# Patient Record
Sex: MALE | URBAN - METROPOLITAN AREA
[De-identification: names, ages, dates, MRNs, and addresses within clinical notes are randomized per-mention and may not be internally consistent; named-entity substitution may affect disease eponyms.]

---

## 2017-05-31 ENCOUNTER — INPATIENT (INPATIENT)
Facility: HOSPITAL | Age: 77
LOS: 0 days | Discharge: HOME | DRG: 728 | End: 2017-06-01
Attending: INTERNAL MEDICINE | Admitting: INTERNAL MEDICINE
Payer: MEDICARE

## 2017-05-31 VITALS
HEART RATE: 91 BPM | WEIGHT: 134.92 LBS | DIASTOLIC BLOOD PRESSURE: 98 MMHG | SYSTOLIC BLOOD PRESSURE: 179 MMHG | RESPIRATION RATE: 18 BRPM | OXYGEN SATURATION: 100 % | TEMPERATURE: 98 F

## 2017-05-31 DIAGNOSIS — Z29.9 ENCOUNTER FOR PROPHYLACTIC MEASURES, UNSPECIFIED: ICD-10-CM

## 2017-05-31 DIAGNOSIS — Z90.49 ACQUIRED ABSENCE OF OTHER SPECIFIED PARTS OF DIGESTIVE TRACT: Chronic | ICD-10-CM

## 2017-05-31 LAB
ALBUMIN SERPL ELPH-MCNC: 4.4 G/DL — SIGNIFICANT CHANGE UP (ref 3.3–5)
ALP SERPL-CCNC: 69 U/L — SIGNIFICANT CHANGE UP (ref 40–120)
ALT FLD-CCNC: 21 U/L — SIGNIFICANT CHANGE UP (ref 10–45)
ANION GAP SERPL CALC-SCNC: 17 MMOL/L — SIGNIFICANT CHANGE UP (ref 5–17)
APPEARANCE UR: CLEAR — SIGNIFICANT CHANGE UP
AST SERPL-CCNC: 33 U/L — SIGNIFICANT CHANGE UP (ref 10–40)
BASOPHILS NFR BLD AUTO: 1 % — SIGNIFICANT CHANGE UP (ref 0–2)
BILIRUB SERPL-MCNC: 0.6 MG/DL — SIGNIFICANT CHANGE UP (ref 0.2–1.2)
BILIRUB UR-MCNC: NEGATIVE — SIGNIFICANT CHANGE UP
BUN SERPL-MCNC: 11 MG/DL — SIGNIFICANT CHANGE UP (ref 7–23)
CALCIUM SERPL-MCNC: 9.4 MG/DL — SIGNIFICANT CHANGE UP (ref 8.4–10.5)
CHLORIDE SERPL-SCNC: 88 MMOL/L — LOW (ref 96–108)
CK MB CFR SERPL CALC: 4.1 NG/ML — SIGNIFICANT CHANGE UP (ref 0–6.7)
CK SERPL-CCNC: 137 U/L — SIGNIFICANT CHANGE UP (ref 30–200)
CO2 SERPL-SCNC: 20 MMOL/L — LOW (ref 22–31)
COLOR SPEC: YELLOW — SIGNIFICANT CHANGE UP
CREAT SERPL-MCNC: 0.9 MG/DL — SIGNIFICANT CHANGE UP (ref 0.5–1.3)
DIFF PNL FLD: NEGATIVE — SIGNIFICANT CHANGE UP
EOSINOPHIL NFR BLD AUTO: 1.8 % — SIGNIFICANT CHANGE UP (ref 0–6)
GLUCOSE SERPL-MCNC: 112 MG/DL — HIGH (ref 70–99)
GLUCOSE UR QL: NEGATIVE — SIGNIFICANT CHANGE UP
HCT VFR BLD CALC: 37.4 % — LOW (ref 39–50)
HGB BLD-MCNC: 13.8 G/DL — SIGNIFICANT CHANGE UP (ref 13–17)
INR BLD: 1.06 — SIGNIFICANT CHANGE UP (ref 0.88–1.16)
KETONES UR-MCNC: (no result) MG/DL
LACTATE SERPL-SCNC: 2.1 MMOL/L — HIGH (ref 0.5–2)
LEUKOCYTE ESTERASE UR-ACNC: NEGATIVE — SIGNIFICANT CHANGE UP
LYMPHOCYTES # BLD AUTO: 11.7 % — LOW (ref 13–44)
MCHC RBC-ENTMCNC: 30.9 PG — SIGNIFICANT CHANGE UP (ref 27–34)
MCHC RBC-ENTMCNC: 36.9 G/DL — HIGH (ref 32–36)
MCV RBC AUTO: 83.9 FL — SIGNIFICANT CHANGE UP (ref 80–100)
MONOCYTES NFR BLD AUTO: 9.4 % — SIGNIFICANT CHANGE UP (ref 2–14)
NEUTROPHILS NFR BLD AUTO: 76.1 % — SIGNIFICANT CHANGE UP (ref 43–77)
NITRITE UR-MCNC: NEGATIVE — SIGNIFICANT CHANGE UP
PH UR: 8 — SIGNIFICANT CHANGE UP (ref 5–8)
PLATELET # BLD AUTO: 275 K/UL — SIGNIFICANT CHANGE UP (ref 150–400)
POTASSIUM SERPL-MCNC: 3.5 MMOL/L — SIGNIFICANT CHANGE UP (ref 3.5–5.3)
POTASSIUM SERPL-SCNC: 3.5 MMOL/L — SIGNIFICANT CHANGE UP (ref 3.5–5.3)
PROT SERPL-MCNC: 7 G/DL — SIGNIFICANT CHANGE UP (ref 6–8.3)
PROT UR-MCNC: NEGATIVE MG/DL — SIGNIFICANT CHANGE UP
PROTHROM AB SERPL-ACNC: 11.8 SEC — SIGNIFICANT CHANGE UP (ref 9.8–12.7)
RBC # BLD: 4.46 M/UL — SIGNIFICANT CHANGE UP (ref 4.2–5.8)
RBC # FLD: 11.8 % — SIGNIFICANT CHANGE UP (ref 10.3–16.9)
SODIUM SERPL-SCNC: 125 MMOL/L — LOW (ref 135–145)
SP GR SPEC: 1.01 — SIGNIFICANT CHANGE UP (ref 1–1.03)
TROPONIN T SERPL-MCNC: <0.01 NG/ML — SIGNIFICANT CHANGE UP (ref 0–0.01)
TSH SERPL-MCNC: 2.02 UIU/ML — SIGNIFICANT CHANGE UP (ref 0.35–4.94)
UROBILINOGEN FLD QL: 0.2 E.U./DL — SIGNIFICANT CHANGE UP
WBC # BLD: 6.7 K/UL — SIGNIFICANT CHANGE UP (ref 3.8–10.5)
WBC # FLD AUTO: 6.7 K/UL — SIGNIFICANT CHANGE UP (ref 3.8–10.5)

## 2017-05-31 PROCEDURE — 74174 CTA ABD&PLVS W/CONTRAST: CPT | Mod: 26

## 2017-05-31 PROCEDURE — 93010 ELECTROCARDIOGRAM REPORT: CPT | Mod: NC

## 2017-05-31 PROCEDURE — 71275 CT ANGIOGRAPHY CHEST: CPT | Mod: 26

## 2017-05-31 PROCEDURE — 99285 EMERGENCY DEPT VISIT HI MDM: CPT | Mod: 25

## 2017-05-31 PROCEDURE — 71010: CPT | Mod: 26

## 2017-05-31 PROCEDURE — 99222 1ST HOSP IP/OBS MODERATE 55: CPT | Mod: GC

## 2017-05-31 RX ORDER — LOSARTAN POTASSIUM 100 MG/1
50 TABLET, FILM COATED ORAL DAILY
Qty: 0 | Refills: 0 | Status: DISCONTINUED | OUTPATIENT
Start: 2017-06-01 | End: 2017-06-01

## 2017-05-31 RX ORDER — SODIUM CHLORIDE 9 MG/ML
1000 INJECTION INTRAMUSCULAR; INTRAVENOUS; SUBCUTANEOUS
Qty: 0 | Refills: 0 | Status: DISCONTINUED | OUTPATIENT
Start: 2017-05-31 | End: 2017-05-31

## 2017-05-31 RX ORDER — CIPROFLOXACIN LACTATE 400MG/40ML
400 VIAL (ML) INTRAVENOUS EVERY 12 HOURS
Qty: 0 | Refills: 0 | Status: DISCONTINUED | OUTPATIENT
Start: 2017-05-31 | End: 2017-06-01

## 2017-05-31 RX ORDER — TELMISARTAN 20 MG/1
1 TABLET ORAL
Qty: 0 | Refills: 0 | COMMUNITY

## 2017-05-31 RX ORDER — LOSARTAN POTASSIUM 100 MG/1
25 TABLET, FILM COATED ORAL ONCE
Qty: 0 | Refills: 0 | Status: COMPLETED | OUTPATIENT
Start: 2017-05-31 | End: 2017-05-31

## 2017-05-31 RX ORDER — AMLODIPINE BESYLATE 2.5 MG/1
1 TABLET ORAL
Qty: 0 | Refills: 0 | COMMUNITY

## 2017-05-31 RX ORDER — SODIUM CHLORIDE 9 MG/ML
1000 INJECTION INTRAMUSCULAR; INTRAVENOUS; SUBCUTANEOUS ONCE
Qty: 0 | Refills: 0 | Status: COMPLETED | OUTPATIENT
Start: 2017-05-31 | End: 2017-05-31

## 2017-05-31 RX ORDER — AMLODIPINE BESYLATE 2.5 MG/1
5 TABLET ORAL ONCE
Qty: 0 | Refills: 0 | Status: COMPLETED | OUTPATIENT
Start: 2017-05-31 | End: 2017-05-31

## 2017-05-31 RX ORDER — HEPARIN SODIUM 5000 [USP'U]/ML
5000 INJECTION INTRAVENOUS; SUBCUTANEOUS EVERY 8 HOURS
Qty: 0 | Refills: 0 | Status: DISCONTINUED | OUTPATIENT
Start: 2017-05-31 | End: 2017-06-01

## 2017-05-31 RX ORDER — AMLODIPINE BESYLATE 2.5 MG/1
5 TABLET ORAL DAILY
Qty: 0 | Refills: 0 | Status: DISCONTINUED | OUTPATIENT
Start: 2017-05-31 | End: 2017-06-01

## 2017-05-31 RX ORDER — ONDANSETRON 8 MG/1
4 TABLET, FILM COATED ORAL EVERY 6 HOURS
Qty: 0 | Refills: 0 | Status: DISCONTINUED | OUTPATIENT
Start: 2017-05-31 | End: 2017-06-01

## 2017-05-31 RX ORDER — TAMSULOSIN HYDROCHLORIDE 0.4 MG/1
0.4 CAPSULE ORAL AT BEDTIME
Qty: 0 | Refills: 0 | Status: DISCONTINUED | OUTPATIENT
Start: 2017-05-31 | End: 2017-06-01

## 2017-05-31 RX ORDER — SODIUM CHLORIDE 9 MG/ML
1000 INJECTION INTRAMUSCULAR; INTRAVENOUS; SUBCUTANEOUS
Qty: 0 | Refills: 0 | Status: DISCONTINUED | OUTPATIENT
Start: 2017-05-31 | End: 2017-06-01

## 2017-05-31 RX ORDER — LOSARTAN POTASSIUM 100 MG/1
6.25 TABLET, FILM COATED ORAL DAILY
Qty: 0 | Refills: 0 | Status: DISCONTINUED | OUTPATIENT
Start: 2017-06-01 | End: 2017-06-01

## 2017-05-31 RX ADMIN — LOSARTAN POTASSIUM 25 MILLIGRAM(S): 100 TABLET, FILM COATED ORAL at 20:41

## 2017-05-31 RX ADMIN — SODIUM CHLORIDE 1000 MILLILITER(S): 9 INJECTION INTRAMUSCULAR; INTRAVENOUS; SUBCUTANEOUS at 18:30

## 2017-05-31 RX ADMIN — TAMSULOSIN HYDROCHLORIDE 0.4 MILLIGRAM(S): 0.4 CAPSULE ORAL at 22:01

## 2017-05-31 RX ADMIN — SODIUM CHLORIDE 120 MILLILITER(S): 9 INJECTION INTRAMUSCULAR; INTRAVENOUS; SUBCUTANEOUS at 23:36

## 2017-05-31 RX ADMIN — SODIUM CHLORIDE 100 MILLILITER(S): 9 INJECTION INTRAMUSCULAR; INTRAVENOUS; SUBCUTANEOUS at 19:34

## 2017-05-31 RX ADMIN — AMLODIPINE BESYLATE 5 MILLIGRAM(S): 2.5 TABLET ORAL at 20:41

## 2017-05-31 RX ADMIN — Medication 200 MILLIGRAM(S): at 22:01

## 2017-05-31 NOTE — H&P ADULT - ASSESSMENT
78 yo M with pmhx of HTN, bradycardia and recent prostatitis who presented w/SOB and weakness found to have HTN urgency and urinary retention likely secondary to prostatitis.

## 2017-05-31 NOTE — H&P ADULT - HISTORY OF PRESENT ILLNESS
Pt is a 78 yo M with pmhx of labile HTN, bradycardia w/syncopal episodes, and recent prostatitis who presents tonight with complaints of progressily feeling weak and short of breath. He reports going to the ED a few days ago for shortness of breath, jaw pain and nausea. A work up was done at a hospital in [facility] which was negative (including troponins and EKG) and he was sent home. Of note, he also had a recent echocardiogram done by his cardiologist Valerie Taylor, which was unchanged and WNL. He also reports recently seeing his urologist who prescribed a 10 day course of Ciprofloxacin which he finished on three days ago and had been symptom free until tonight; he also reports an increased PSA of 8. Since in the ED the pt reports having increased hesitancy with nausea, w/o vomiting or diarrhea or fever but does endorse chills.     Of note in the ED the pt was found to be retaining urine on a bladder scan. He reports feeling relief after the Pt is a 78 yo M with pmhx of labile HTN, bradycardia w/syncopal episodes, and recent prostatitis who presents tonight with complaints of progressily feeling weak and short of breath. He reports going to the ED a few days ago for shortness of breath, jaw pain and nausea. A work up was done at a hospital in [facility] which was negative (including troponins and EKG) and he was sent home. Of note, he also had a recent echocardiogram done by his cardiologist Dr. Taylor, which was unchanged and WNL. He also reports recently seeing his urologist who prescribed a 10 day course of Ciprofloxacin which he finished on three days ago and had been symptom free until tonight; he also reports an increased PSA of 8. Since in the ED the pt reports having increased hesitancy with nausea, w/o vomiting or diarrhea or fever but does endorse chills.     Of note in the ED the pt was found to be retaining 380cc of urine on a bladder scan. He reports feeling relief after the catheterization. 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 He reports going to the ED a few days ago for shortness of breath, jaw pain and nausea. A work up was done at a [facility] in [facility] which was negative (including troponins and EKG) and he was sent home. Of note, he also had a recent echocardiogram done by his cardiologist Dr. Taylor, which was unchanged and WNL. He also reports recently seeing his urologist who prescribed a 10 day course of Ciprofloxacin which he finished on three days ago and had been symptom free until tonight; he also reports an increased PSA of 8. Since in the ED the pt reports having increased hesitancy with nausea, w/o vomiting or diarrhea or fever but does endorse chills.     Of note in the ED the pt was found to be retaining 380cc of urine on a bladder scan. He reports feeling relief after the catheterization.

## 2017-05-31 NOTE — H&P ADULT - NSHPPHYSICALEXAM_GEN_ALL_CORE
.  VITAL SIGNS:  T(C): 36.6, Max: 36.8 (05-31 @ 16:44)  T(F): 97.9, Max: 98.3 (05-31 @ 16:44)  HR: 71 (68 - 91)  BP: 156/79 (156/79 - 189/83)  BP(mean): --  RR: 18 (18 - 18)  SpO2: 97% (97% - 100%)  Wt(kg): --    PHYSICAL EXAM:    Constitutional: WDWN resting comfortably in bed; NAD  Head: NC/AT  Eyes: PERRL, EOMI, clear conjunctiva  ENT: no nasal discharge; uvula midline, no oropharyngeal erythema or exudates; MMM  Neck: supple; no JVD or thyromegaly  Respiratory: CTA B/L; no W/R/R, no retractions  Cardiac: +S1/S2; RRR; no M/R/G; PMI non-displaced  Gastrointestinal: soft, NT/ND; no rebound or guarding; +BSx4 -  Genitourinary: normal external genitalia, sandoval in place, blood at the meatus   Extremities: WWP, no clubbing or cyanosis; no peripheral edema  Musculoskeletal: NROM x4; no joint swelling, tenderness or erythema  Vascular: 2+ PT pulses B/L  Dermatologic: skin warm, dry and intact; no rashes, wounds, or scars  Lymphatic: no submandibular or cervical LAD  Neurologic: AAOx3; CNII-XII grossly intact; no focal deficits  Psychiatric: affect and characteristics of appearance, verbalizations, behaviors are appropriate .  VITAL SIGNS:  T(C): 36.6, Max: 36.8 (05-31 @ 16:44)  T(F): 97.9, Max: 98.3 (05-31 @ 16:44)  HR: 71 (68 - 91)  BP: 156/79 (156/79 - 189/83)  BP(mean): --  RR: 18 (18 - 18)  SpO2: 97% (97% - 100%)  Wt(kg): --    PHYSICAL EXAM:    Constitutional: WDWN resting comfortably in bed; NAD  Head: NC/AT  Eyes: PERRL, EOMI, clear conjunctiva  ENT: no nasal discharge; uvula midline, no oropharyngeal erythema or exudates; dry MM  Neck: supple; no JVD or thyromegaly  Respiratory: CTA B/L; no W/R/R, no retractions  Cardiac: +S1/S2; RRR; no M/R/G; PMI non-displaced  Gastrointestinal: soft, NT/ND; no rebound or guarding; +BSx4 -  Genitourinary: normal external genitalia, sandoval in place, blood at the meatus   Extremities: WWP, no clubbing or cyanosis; no peripheral edema  Musculoskeletal: NROM x4; no joint swelling, tenderness or erythema  Vascular: 2+ PT pulses B/L  Dermatologic: skin warm, dry and intact; no rashes, wounds, or scars  Lymphatic: no submandibular or cervical LAD  Neurologic: AAOx3; CNII-XII grossly intact; no focal deficits  Psychiatric: affect and characteristics of appearance, verbalizations, behaviors are appropriate .  VITAL SIGNS:  T(C): 36.6, Max: 36.8 (05-31 @ 16:44)  T(F): 97.9, Max: 98.3 (05-31 @ 16:44)  HR: 71 (68 - 91)  BP: 156/79 (156/79 - 189/83)  BP(mean): --  RR: 18 (18 - 18)  SpO2: 97% (97% - 100%)  Wt(kg): --    PHYSICAL EXAM:    Constitutional: WDWN resting comfortably in bed; NAD  Head: NC/AT  Eyes: PERRL, EOMI, clear conjunctiva  ENT: no nasal discharge; uvula midline, no oropharyngeal erythema or exudates; dry MM  Neck: supple; no JVD or thyromegaly  Respiratory: CTA B/L; no W/R/R, no retractions  Cardiac: +S1/S2; RRR; no M/R/G; PMI non-displaced  Gastrointestinal: soft, NT/ND; no rebound or guarding; +BSx4 -  Genitourinary: normal external genitalia, sandoval in place, blood at the meatus; tender prostate, feces in the rectal vault   Extremities: WWP, no clubbing or cyanosis; no peripheral edema  Musculoskeletal: NROM x4; no joint swelling, tenderness or erythema  Vascular: 2+ PT pulses B/L  Dermatologic: skin warm, dry and intact; no rashes, wounds, or scars  Lymphatic: no submandibular or cervical LAD  Neurologic: AAOx3; CNII-XII grossly intact; no focal deficits  Psychiatric: affect and characteristics of appearance, verbalizations, behaviors are appropriate

## 2017-05-31 NOTE — H&P ADULT - PROBLEM SELECTOR PLAN 5
-pt reports taking Nicardia 40mg in the am and 12.5mg at night, was found to be in hypertensive urgency in the ED w/BP of 189/83, likely secondary to not taking PM medication secondary to nausea vs urinary rentention   asymptomatic, was given Norvasc 5mg, cozaar 25mg  -will monitor BP overnight w/plan to c/w home regimen,

## 2017-05-31 NOTE — ED ADULT NURSE NOTE - OBJECTIVE STATEMENT
S1/S2 heart sounds auscultated, no murmur noted. Breath sounds clear throughout all lung fields. S1/S2 heart sounds auscultated, no murmur noted. Breath sounds clear throughout all lung fields. Patient reports SOB x 2 days, denies associative symptoms (dizziness, headache, chest pain, paresthesias).

## 2017-05-31 NOTE — ED PROVIDER NOTE - OBJECTIVE STATEMENT
77 m hx of HTN w sob- sob x 4 weeks, sig worse over the past 48 hrs- went to outside ED last night- had labs, trop, cxr all rept negative  presents with persistent weakness/sob and nausea- denies abd pain/n/v  subj fever- feels warm no chills no cough no sore throat  mild-moderate severity  no exac/allev factors

## 2017-05-31 NOTE — H&P ADULT - PROBLEM SELECTOR PLAN 2
on PE CTA b/l, w/o wheezing or SOB on exam, CXR w/o evidence of consolidation, CT chest w/o evidence of PE or dissection, likely etiologies include anxiety in the setting of feeling unwell/previous syncopal episodes from bradycardia vs fatigue from infection

## 2017-05-31 NOTE — H&P ADULT - PROBLEM SELECTOR PLAN 1
pt was found to be retaining 380cc on bladder scan in the Ed w/complaints of increased hesitancy and recent treatment for prostatitis finishing three days ago, likely etiology of retention is prostate edema shown on CT abd, pt is w/o leukocytosis or positive UA but in the setting of recent antibiotic use  -s/p ciprofloxacin 10 day course recency and now cipro 500mg in the ED, will c/w  -will f/u with outpatient urologist Dr. Puentes at [facility] for collateral   -c/w sandoval (placed in the ed) and flomax 04mg QHS  -zofran 4mg Q6 hrs as needed for nausea, will monitor qtc w/am EKG pt was found to be retaining 380cc on bladder scan in the Ed w/complaints of increased hesitancy and recent treatment for prostatitis finishing three days ago, likely etiology of retention is prostate edema shown on CT abd, pt is w/o leukocytosis or positive UA but in the setting of recent antibiotic use  -s/p ciprofloxacin 10 day course recency and now cipro 400mg in the ED, will c/w levaquin 500mg daily starting in the am  -will f/u with outpatient urologist Dr. Puentes at [facility] for collateral and possibly consult urology in patient if indicated    -c/w sandoval (placed in the ed) and flomax 04mg QHS  -zofran 4mg Q6 hrs as needed for nausea, will monitor qtc w/am EKG pt was found to be retaining 380cc on bladder scan in the Ed w/complaints of increased hesitancy and recent treatment for prostatitis finishing three days ago, likely etiology of retention is prostate edema shown on CT abd, pt is w/o leukocytosis or positive UA but in the setting of recent antibiotic use  -s/p ciprofloxacin 10 day course recency and now cipro 400mg in the ED, will c/w cpiro 400mg Q12 hrs  -consult urology for possible need for suprapubic catheterization given likely prostatitis w/retention   -will f/u with outpatient urologist Dr. Puentes at [facility] for collateral   -c/w sandoval (placed in the ed) and flomax 04mg QHS  -zofran 4mg Q6 hrs as needed for nausea, will monitor qtc w/am EKG pt was found to be retaining 380cc on bladder scan in the Ed w/complaints of increased hesitancy and recent treatment for prostatitis finishing three days ago, likely etiology of retention is prostate edema shown on CT abd, pt is w/o leukocytosis or positive UA but in the setting of recent antibiotic use  -s/p ciprofloxacin 10 day course recency and now cipro 400mg in the ED, will c/w cpiro 400mg Q12 hrs  -consult urology for possible need for suprapubic catheterization given likely prostatitis w/retention   -f/u urine culture and blood cultures  -will f/u with outpatient urologist Dr. Puentes at [facility] for collateral   -c/w sandoval (placed in the ed) and flomax 04mg QHS  -zofran 4mg Q6 hrs as needed for nausea, will monitor qtc w/am EKG

## 2017-05-31 NOTE — H&P ADULT - NSHPSOCIALHISTORY_GEN_ALL_CORE
etoh: two glasses of wine a week  cigarettes - denies former smoker 40 years ago 3-5 cigs/day for 3 yrs  drugs of abuse - denies

## 2017-05-31 NOTE — H&P ADULT - ATTENDING COMMENTS
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 Additionally pt reports went home but reports vomiting once w/ recurring dyspnea, prompting pt to be seen here. While in ED pt experienced urinary retention, prompting placement of sandoval catheter. Of note, pt's daughter reports that pt was in [facility] for [facility] Day weekend and was exposed to sick contacts (children with URIs). Pt denies cough but reports feeling "feverish."  pt denies abdominal or flank pain.     pt is thin, elderly, frail appearing but in NAD, awake, alert, cooperative; agree w/ PE findings as above, rectal exam deferred as done by PGY2; in addition pt w/ no CVA tenderness b/l ; motor strength 5/5 in all extremities    a/p:   1. vomiting / dyspnea: no focal findings on lung examination; no focal findings on CT angio chest; no central PE noted; pt in NAD, breathing comfortably on NC; possible URI/ viral infection in setting of sick contact exposures, however pt and daughter do not wish to have RVP done to see if patient has a URI. will monitor for now.   2. urinary retention/ prostatitis: s/p sandoval catheter placement w/ good UO; started on cipro IV; follow up ctxs; follow up urology recs.   3. 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 Additionally pt reports went home but reports vomiting once w/ recurring dyspnea, prompting pt to be seen here. While in ED pt experienced urinary retention, prompting placement of sandoval catheter. Of note, pt's daughter reports that pt was in [facility] for Memorial Day weekend and was exposed to sick contacts (children with URIs). Pt denies cough but reports feeling "feverish."  pt denies abdominal or flank pain.     pt is thin, elderly, frail appearing but in NAD, awake, alert, cooperative; agree w/ PE findings as above, rectal exam deferred as done by PGY2; in addition pt w/ no CVA tenderness b/l ; motor strength 5/5 in all extremities    a/p:   1. vomiting / dyspnea: no focal findings on lung examination; no focal findings on CT angio chest; no central PE noted; pt in NAD, breathing comfortably on NC; possible URI/ viral infection in setting of sick contact exposures, however pt and daughter do not wish to have RVP done to see if patient has a URI. will monitor for now.   2. urinary retention/ prostatitis: s/p sandoval catheter placement w/ good UO; started on cipro IV; follow up ctxs; follow up urology recs.   3. elevated lactic acid: in setting of possible URI and prostatitis; monitor BMP, trend lactic acid; on IVFs  4. hyponatremia: on NS; monitor BMP, avoid overcorrection  5. hypochloremia: in setting of vomiting; monitor BMP  6. 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 Additionally pt reports that he went home but reports vomiting once w/ recurring dyspnea, prompting pt to be seen here. While in ED pt experienced urinary retention, prompting placement of sandoval catheter. Of note, pt's daughter reports that pt was in [facility] for [facility] Day weekend and was exposed to sick contacts (children with URIs). Pt denies cough but reports feeling "feverish."  pt denies abdominal or flank pain.     pt is thin, elderly, frail appearing but in NAD, awake, alert, cooperative; agree w/ PE findings as above, rectal exam deferred as done by PGY2; in addition pt w/ no CVA tenderness b/l ; motor strength 5/5 in all extremities    a/p:   1. vomiting / dyspnea: no focal findings on lung examination; no focal findings on CT angio chest; no central PE noted; pt in NAD, breathing comfortably on NC; possible URI/ viral infection in setting of sick contact exposures, however pt and daughter do not wish to have RVP done to see if patient has a URI. will monitor for now.   2. urinary retention/ prostatitis: s/p sandoval catheter placement w/ good UO; started on cipro IV; follow up ctxs; follow up urology recs.   3. elevated lactic acid: in setting of possible URI and prostatitis; monitor BMP, trend lactic acid; on IVFs  4. hyponatremia: on NS; monitor BMP, avoid overcorrection  5. hypochloremia: in setting of vomiting; monitor BMP  6. HTN: given losartan and norvasc; monitor BP overnight; c/w ARB and norvasc

## 2017-05-31 NOTE — H&P ADULT - PROBLEM SELECTOR PLAN 6
DASH diet  -replete electrolytes as needed currently not bradycardic, as per the pt he has a cardiologist, Dr. Taylor who determined that the syncope was secondary to exercise induced resting bradycardia   -will continue to monitor HR and will f/u with Dr. Taylor regarding recent echocardiogram

## 2017-05-31 NOTE — H&P ADULT - NSHPREVIEWOFSYSTEMS_GEN_ALL_CORE
REVIEW OF SYSTEMS:    CONSTITUTIONAL: as per HPI  EYES/ENT: No visual changes;  No vertigo or throat pain   NECK: No pain or stiffness  RESPIRATORY: No cough, wheezing, hemoptysis;   CARDIOVASCULAR: No chest pain or palpitations  GASTROINTESTINAL: as per HPI  GENITOURINARY: as per HPI  NEUROLOGICAL: No numbness or weakness  SKIN: No itching, burning, rashes, or lesions   All other review of systems is negative unless indicated above.

## 2017-05-31 NOTE — H&P ADULT - NSHPLABSRESULTS_GEN_ALL_CORE
Complete Blood Count + Automated Diff (05.31.17 @ 17:33)    WBC Count: 6.7 K/uL    EXAM:  CT ANGIO CHEST (W)AW IC                          EXAM:  CT ANGIO ABD PELV (W)AW IC                          PROCEDURE DATE:  05/31/2017     125  [address] 350   5       IMPRESSION:  1.  No aortic dissection.  2.  Moderate to severe atherosclerotic calcification of the aorta and its   branches.  3.  Extensive stool throughout the colon.  4.  Marked prostatomegaly.

## 2017-05-31 NOTE — H&P ADULT - PROBLEM SELECTOR PLAN 3
pt w/sodium of 125 and UA specific gravity of 1.01 likely dehydrated w/dry mucous membranes on PE and elevated lactate of 2.1  -s/p 1LNS in the ED now on NS@125 cc/hr, will f/u repeat 2 am lactate and decrease IVF once cleared  -trend sodium, will monitor not to correct more that 0.5mlEq/hr

## 2017-05-31 NOTE — ED PROVIDER NOTE - MEDICAL DECISION MAKING DETAILS
post void residual 375- feels urgency, given weakness/sob- will admit for IV hydration, repeat chem in the am

## 2017-06-01 VITALS
DIASTOLIC BLOOD PRESSURE: 70 MMHG | HEART RATE: 70 BPM | OXYGEN SATURATION: 99 % | SYSTOLIC BLOOD PRESSURE: 159 MMHG | TEMPERATURE: 98 F

## 2017-06-01 LAB
ALBUMIN SERPL ELPH-MCNC: 3.7 G/DL — SIGNIFICANT CHANGE UP (ref 3.3–5)
ALP SERPL-CCNC: 57 U/L — SIGNIFICANT CHANGE UP (ref 40–120)
ALT FLD-CCNC: 17 U/L — SIGNIFICANT CHANGE UP (ref 10–45)
ANION GAP SERPL CALC-SCNC: 14 MMOL/L — SIGNIFICANT CHANGE UP (ref 5–17)
ANION GAP SERPL CALC-SCNC: 14 MMOL/L — SIGNIFICANT CHANGE UP (ref 5–17)
AST SERPL-CCNC: 25 U/L — SIGNIFICANT CHANGE UP (ref 10–40)
BILIRUB SERPL-MCNC: 0.8 MG/DL — SIGNIFICANT CHANGE UP (ref 0.2–1.2)
BUN SERPL-MCNC: 7 MG/DL — SIGNIFICANT CHANGE UP (ref 7–23)
BUN SERPL-MCNC: 8 MG/DL — SIGNIFICANT CHANGE UP (ref 7–23)
CALCIUM SERPL-MCNC: 8.6 MG/DL — SIGNIFICANT CHANGE UP (ref 8.4–10.5)
CALCIUM SERPL-MCNC: 8.9 MG/DL — SIGNIFICANT CHANGE UP (ref 8.4–10.5)
CHLORIDE SERPL-SCNC: 91 MMOL/L — LOW (ref 96–108)
CHLORIDE SERPL-SCNC: 94 MMOL/L — LOW (ref 96–108)
CO2 SERPL-SCNC: 22 MMOL/L — SIGNIFICANT CHANGE UP (ref 22–31)
CO2 SERPL-SCNC: 25 MMOL/L — SIGNIFICANT CHANGE UP (ref 22–31)
CREAT SERPL-MCNC: 0.9 MG/DL — SIGNIFICANT CHANGE UP (ref 0.5–1.3)
CREAT SERPL-MCNC: 0.9 MG/DL — SIGNIFICANT CHANGE UP (ref 0.5–1.3)
GLUCOSE SERPL-MCNC: 92 MG/DL — SIGNIFICANT CHANGE UP (ref 70–99)
GLUCOSE SERPL-MCNC: 95 MG/DL — SIGNIFICANT CHANGE UP (ref 70–99)
LACTATE SERPL-SCNC: 1.7 MMOL/L — SIGNIFICANT CHANGE UP (ref 0.5–2)
MAGNESIUM SERPL-MCNC: 1.9 MG/DL — SIGNIFICANT CHANGE UP (ref 1.6–2.6)
PHOSPHATE SERPL-MCNC: 3 MG/DL — SIGNIFICANT CHANGE UP (ref 2.5–4.5)
POTASSIUM SERPL-MCNC: 3.1 MMOL/L — LOW (ref 3.5–5.3)
POTASSIUM SERPL-MCNC: 3.5 MMOL/L — SIGNIFICANT CHANGE UP (ref 3.5–5.3)
POTASSIUM SERPL-SCNC: 3.1 MMOL/L — LOW (ref 3.5–5.3)
POTASSIUM SERPL-SCNC: 3.5 MMOL/L — SIGNIFICANT CHANGE UP (ref 3.5–5.3)
PROT SERPL-MCNC: 6 G/DL — SIGNIFICANT CHANGE UP (ref 6–8.3)
SODIUM SERPL-SCNC: 130 MMOL/L — LOW (ref 135–145)
SODIUM SERPL-SCNC: 130 MMOL/L — LOW (ref 135–145)

## 2017-06-01 PROCEDURE — 81003 URINALYSIS AUTO W/O SCOPE: CPT

## 2017-06-01 PROCEDURE — 99222 1ST HOSP IP/OBS MODERATE 55: CPT

## 2017-06-01 PROCEDURE — 82553 CREATINE MB FRACTION: CPT

## 2017-06-01 PROCEDURE — 85025 COMPLETE CBC W/AUTO DIFF WBC: CPT

## 2017-06-01 PROCEDURE — 93010 ELECTROCARDIOGRAM REPORT: CPT

## 2017-06-01 PROCEDURE — 99239 HOSP IP/OBS DSCHRG MGMT >30: CPT

## 2017-06-01 PROCEDURE — 74174 CTA ABD&PLVS W/CONTRAST: CPT

## 2017-06-01 PROCEDURE — 84100 ASSAY OF PHOSPHORUS: CPT

## 2017-06-01 PROCEDURE — 71275 CT ANGIOGRAPHY CHEST: CPT

## 2017-06-01 PROCEDURE — 80053 COMPREHEN METABOLIC PANEL: CPT

## 2017-06-01 PROCEDURE — 82550 ASSAY OF CK (CPK): CPT

## 2017-06-01 PROCEDURE — 83735 ASSAY OF MAGNESIUM: CPT

## 2017-06-01 PROCEDURE — 99285 EMERGENCY DEPT VISIT HI MDM: CPT | Mod: 25

## 2017-06-01 PROCEDURE — 83605 ASSAY OF LACTIC ACID: CPT

## 2017-06-01 PROCEDURE — 71045 X-RAY EXAM CHEST 1 VIEW: CPT

## 2017-06-01 PROCEDURE — 93005 ELECTROCARDIOGRAM TRACING: CPT

## 2017-06-01 PROCEDURE — 87086 URINE CULTURE/COLONY COUNT: CPT

## 2017-06-01 PROCEDURE — 80048 BASIC METABOLIC PNL TOTAL CA: CPT

## 2017-06-01 PROCEDURE — 85610 PROTHROMBIN TIME: CPT

## 2017-06-01 PROCEDURE — 87040 BLOOD CULTURE FOR BACTERIA: CPT

## 2017-06-01 PROCEDURE — 84443 ASSAY THYROID STIM HORMONE: CPT

## 2017-06-01 PROCEDURE — 84484 ASSAY OF TROPONIN QUANT: CPT

## 2017-06-01 PROCEDURE — 36415 COLL VENOUS BLD VENIPUNCTURE: CPT

## 2017-06-01 RX ORDER — CIPROFLOXACIN LACTATE 400MG/40ML
1 VIAL (ML) INTRAVENOUS
Qty: 14 | Refills: 0 | OUTPATIENT
Start: 2017-06-01 | End: 2017-06-08

## 2017-06-01 RX ORDER — SODIUM CHLORIDE 9 MG/ML
1000 INJECTION INTRAMUSCULAR; INTRAVENOUS; SUBCUTANEOUS
Qty: 0 | Refills: 0 | Status: DISCONTINUED | OUTPATIENT
Start: 2017-06-01 | End: 2017-06-01

## 2017-06-01 RX ORDER — LIDOCAINE HCL 20 MG/ML
10 VIAL (ML) INJECTION EVERY 4 HOURS
Qty: 0 | Refills: 0 | Status: DISCONTINUED | OUTPATIENT
Start: 2017-06-01 | End: 2017-06-01

## 2017-06-01 RX ORDER — TAMSULOSIN HYDROCHLORIDE 0.4 MG/1
1 CAPSULE ORAL
Qty: 30 | Refills: 0 | OUTPATIENT
Start: 2017-06-01 | End: 2017-07-01

## 2017-06-01 RX ADMIN — ONDANSETRON 4 MILLIGRAM(S): 8 TABLET, FILM COATED ORAL at 02:02

## 2017-06-01 RX ADMIN — AMLODIPINE BESYLATE 5 MILLIGRAM(S): 2.5 TABLET ORAL at 06:51

## 2017-06-01 RX ADMIN — LOSARTAN POTASSIUM 50 MILLIGRAM(S): 100 TABLET, FILM COATED ORAL at 06:51

## 2017-06-01 RX ADMIN — SODIUM CHLORIDE 75 MILLILITER(S): 9 INJECTION INTRAMUSCULAR; INTRAVENOUS; SUBCUTANEOUS at 06:56

## 2017-06-01 RX ADMIN — Medication 200 MILLIGRAM(S): at 09:56

## 2017-06-01 RX ADMIN — Medication 10 MILLILITER(S): at 02:03

## 2017-06-01 NOTE — DISCHARGE NOTE ADULT - CARE PLAN
Principal Discharge DX:	Prostatitis  Goal:	Finish course of antibiotics. Please see outpatient urologist Monday for further antibiotic needs.  Instructions for follow-up, activity and diet:	You were restarted on cipro as you likely did not have a full treatment course. Please continue the antibiotics for 7 more days. Make an appointment with your urologist on Monday to assess need for continued antibiotics. You can continue using flomax for your urinary retention and follow up with urology for further management. If you would like to follow up with Dr. Pereira (urology from [facility]), his information is listed below.  Secondary Diagnosis:	Shortness of breath  Instructions for follow-up, activity and diet:	You were worked up already for possible cardiac issues for your shortness of breath. EKG, cardiac blood work, and echo done in the outpatient setting were normal. Please follow up with your Cardiologist (Dr. Martinez) for a stress test to further evaluate your symptoms. You can start aspirin for prevention, but please hold off on starting until the blood in your urine is gone. If you would like to follow up with Dr. Suarez (cardiology from [facility]), his information is listed below. Principal Discharge DX:	Prostatitis  Goal:	Finish course of antibiotics. Please see outpatient urologist Monday for further antibiotic needs.  Instructions for follow-up, activity and diet:	You were restarted on cipro as you likely did not have a full treatment course. Please continue the antibiotics for 7 more days. Make an appointment with your urologist on Monday to assess need for continued antibiotics. You can continue using flomax for your urinary retention and follow up with urology for further management. If you would like to follow up with Dr. Pereira (urology from [facility]), his information is listed below.  Secondary Diagnosis:	Shortness of breath  Instructions for follow-up, activity and diet:	You were worked up already for possible cardiac issues for your shortness of breath. EKG, cardiac blood work, and echo done in the outpatient setting were normal. Please follow up with your Cardiologist (Dr. Martinez) for a stress test to further evaluate your symptoms. You can start aspirin for prevention, but please hold off on starting until the blood in your urine is gone. If you would like to follow up with Dr. Suarez (cardiology from [facility]), his information is listed below. Principal Discharge DX:	Prostatitis  Goal:	Finish course of antibiotics. Please see outpatient urologist Monday for further antibiotic needs.  Instructions for follow-up, activity and diet:	You were restarted on cipro as you likely did not have a full treatment course. Please continue the antibiotics for 7 more days. Make an appointment with your urologist on Monday to assess need for continued antibiotics. You can continue using flomax for your urinary retention and follow up with urology for further management. If you would like to follow up with Dr. Pereira (urology from [facility]), his information is listed below.  Secondary Diagnosis:	Shortness of breath  Instructions for follow-up, activity and diet:	You were worked up already for possible cardiac issues for your shortness of breath. EKG, cardiac blood work, and echo done in the outpatient setting were normal. Please follow up with your Cardiologist (Dr. Martinez) for a stress test to further evaluate your symptoms. You can start aspirin for prevention, but please hold off on starting until the blood in your urine is gone. If you would like to follow up with Dr. Suarez (cardiology from [facility]), his information is listed below.

## 2017-06-01 NOTE — DISCHARGE NOTE ADULT - HOSPITAL COURSE
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 He reports going to the ED a few days ago for shortness of breath, jaw pain and nausea. A work up was done at a hospital in [facility] which was negative (including troponins and EKG) and he was sent home. Of note, he also had a recent echocardiogram done by his cardiologist Dr. Taylor, which was unchanged and WNL. He also reports recently seeing his urologist who prescribed a 10 day course of Ciprofloxacin which he finished on 5/28 and had been symptom free until admission; he also reports an increased PSA of 8. Since in the ED the pt reports having increased hesitancy with nausea, w/o vomiting or diarrhea or fever but does endorse chills.     Of note in the ED the pt was found to be retaining 380cc of urine on a bladder scan. ED placed sandoval catheter on 5/31. Pt remained stable o/n w/o leukocytosis and afebrile. Sandoval taken out, passed TOV w/ PVR in 50s. Pt had episode of hematuria, was evaluated by urology (Dr. Pereira), no intervention at this time. No rectal temps/DREs/etc. Pt w/ be d/c-ed w/ cipro to be continued for 7d. He was told to f/u w/ OP uro for possible continuation of abx and management of sx. Pt was seen by cardiology (Dr. Hernandez) for dyspnea hx. Pt previous w/u nl EKG/Trop/Echo. Pt to f/u w/ OP cardio for possible stress test. Pt also recommended to start ASA but only after hematuria resolves, can f/u w/ uro/cardio about starting med. Pt HD stable and ready for d/c.

## 2017-06-01 NOTE — DISCHARGE NOTE ADULT - INSTRUCTIONS
Do not participate in extreme salt restriction as it may be causing hypotensive episodes leading to  your dizziness and you were found to have a very low sodium on admission.

## 2017-06-01 NOTE — DISCHARGE NOTE ADULT - PROVIDER TOKENS
TOKEN:'[id number]',TOKEN:'[id number]',FREE:[LAST:[Dr. Martinez],FIRST:[Hallie],PHONE:[phone]],FAX:[(   )    -]]

## 2017-06-01 NOTE — DISCHARGE NOTE ADULT - PATIENT PORTAL LINK FT
“You can access the Follow[facility] Patient Portal, offered by [facility], by registering with the following website: [email or web address]”

## 2017-06-01 NOTE — DISCHARGE NOTE ADULT - MEDICATION SUMMARY - MEDICATIONS TO TAKE
I will START or STAY ON the medications listed below when I get home from the hospital:    Micardis 20 mg oral tablet  -- 1 tab(s) by mouth once a day  -- Indication: For HTN (hypertension)    tamsulosin 0.4 mg oral capsule  -- 1 cap(s) by mouth once a day (at bedtime)  -- Indication: For Urinary retention    amLODIPine 5 mg oral tablet  -- 1 tab(s) by mouth once a day  -- Indication: For HTN (hypertension)    ciprofloxacin 500 mg oral tablet  -- 1 tab(s) by mouth every 12 hours  -- Indication: For Prostatitis

## 2017-06-01 NOTE — CONSULT NOTE ADULT - SUBJECTIVE AND OBJECTIVE BOX
CONSULT NOTE    Patient is a 77y old  Male who presents with a chief complaint of Prostatitis (31 May 2017 22:58)    GUHx: 77M GUHx prostate bx x 3 (all wnl, last one approximately 6mo ago) with recent elevation of PSA to 8 from 3 (checked annually) reports recent diagnosis of prostatitis s/p cipro x 10d. Pt states that last dose of Cipro was Sunday and by Tuesday his LUTS returned. He was found to be in retention on arrival of ED, relieved by sandoval catheter placement, cipro restarted. Pt reports 3 DREs since prostatitis sxs started. TOV started today, pt has voided 250cc hematuria without PVR but feeling empty. He is scheduled to see his urologist, Dr. Ruddy Hayes, June 5th, but appreciates Dr. Pereira's information for a second opinion. He denies f/c, n/v/d/c, hematuria prior to sandoval catheter placement.      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 He reports going to the ED a few days ago for shortness of breath, jaw pain and nausea. A work up was done at a hospital in [facility] which was negative (including troponins and EKG) and he was sent home. Of note, he also had a recent echocardiogram done by his cardiologist Dr. Taylor, which was unchanged and WNL. He also reports recently seeing his urologist who prescribed a 10 day course of Ciprofloxacin which he finished on three days ago and had been symptom free until tonight; he also reports an increased PSA of 8. Since in the ED the pt reports having increased hesitancy with nausea, w/o vomiting or diarrhea or fever but does endorse chills.     Of note in the ED the pt was found to be retaining 380cc of urine on a bladder scan. He reports feeling relief after the catheterization. (31 May 2017 22:58)      Vital Signs Last 24 Hrs  T(C): 36.4, Max: 36.8 (05-31 @ 16:44)  T(F): 97.6, Max: 98.3 (05-31 @ 16:44)  HR: 70 (61 - 91)  BP: 159/70 (111/67 - 189/83)  BP(mean): --  RR: 20 (17 - 20)  SpO2: 99% (96% - 100%)  I&O's Summary  I & Os for 24h ending 01 Jun 2017 07:00  =============================================  IN: 435 ml / OUT: 2575 ml / NET: -2140 ml    I & Os for current day (as of 01 Jun 2017 13:27)  =============================================  IN: 0 ml / OUT: 225 ml / NET: -225 ml      PE:  Gen: NAD  Abd: soft, NTND  : blood at meatus, normal external male circumsized genitalia. +BRP  THADDEUS: contraindicated     LABS:                        13.8   6.7   )-----------( 275      ( 31 May 2017 17:33 )             37.4     06-01    130<L>  |  94<L>  |  7   ----------------------------<  95  3.1<L>   |  22  |  0.90    Ca    8.9      01 Jun 2017 08:14  Phos  3.0     06-01  Mg     1.9     06-01    TPro  6.0  /  Alb  3.7  /  TBili  0.8  /  DBili  x   /  AST  25  /  ALT  17  /  AlkPhos  57  06-01    PT/INR - ( 31 May 2017 17:33 )   PT: 11.8 sec;   INR: 1.06            Cultures  Culture Results:   No growth at 12 hours (05-31 @ 18:54)  Culture Results:   No growth at 12 hours (05-31 @ 18:54)      A/P  77M with prostatitis currently during TOV  -cont abx, consider upgrade (lakshmi if fever/chills or symptoms worsening)  -f/u Ucxs  -monitor UO  -post void bladder scan (please record in [place])  -OOB  -bowel regimen  -No THADDEUS, rectal temps or anything ID  -f/u as outpt with urologist  - will follow as inpt  CONSULT NOTE    Patient is a 77y old  Male who presents with a chief complaint of Prostatitis (31 May 2017 22:58)    GUHx: 77M GUHx prostate bx x 3 (all wnl, last one approximately 6mo ago) with recent elevation of PSA to 8 from 3 (checked annually) reports recent diagnosis of prostatitis s/p cipro x 10d. Pt states that last dose of Cipro was Sunday and by Tuesday his LUTS returned. He was found to be in retention on arrival of ED, relieved by sandoval catheter placement, cipro restarted. Pt reports 3 DREs since prostatitis sxs started. TOV started today, pt has voided 250cc hematuria without PVR but feeling empty. He is scheduled to see his urologist, Dr. Ruddy Hayes, June 5th, but appreciates Dr. Pereira's information for a second opinion. He denies f/c, n/v/d/c, hematuria prior to sandoval catheter placement.      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 He reports going to the ED a few days ago for shortness of breath, jaw pain and nausea. A work up was done at a hospital in [facility] which was negative (including troponins and EKG) and he was sent home. Of note, he also had a recent echocardiogram done by his cardiologist Dr. Taylor, which was unchanged and WNL. He also reports recently seeing his urologist who prescribed a 10 day course of Ciprofloxacin which he finished on three days ago and had been symptom free until tonight; he also reports an increased PSA of 8. Since in the ED the pt reports having increased hesitancy with nausea, w/o vomiting or diarrhea or fever but does endorse chills.     Of note in the ED the pt was found to be retaining 380cc of urine on a bladder scan. He reports feeling relief after the catheterization. (31 May 2017 22:58)      Vital Signs Last 24 Hrs  T(C): 36.4, Max: 36.8 (05-31 @ 16:44)  T(F): 97.6, Max: 98.3 (05-31 @ 16:44)  HR: 70 (61 - 91)  BP: 159/70 (111/67 - 189/83)  BP(mean): --  RR: 20 (17 - 20)  SpO2: 99% (96% - 100%)  I&O's Summary  I & Os for 24h ending 01 Jun 2017 07:00  =============================================  IN: 435 ml / OUT: 2575 ml / NET: -2140 ml    I & Os for current day (as of 01 Jun 2017 13:27)  =============================================  IN: 0 ml / OUT: 225 ml / NET: -225 ml      PE:  Gen: NAD  Abd: soft, NTND  : blood at meatus, normal external male circumsized genitalia. +BRP  THADDEUS: contraindicated     LABS:                        13.8   6.7   )-----------( 275      ( 31 May 2017 17:33 )             37.4     06-01    130<L>  |  94<L>  |  7   ----------------------------<  95  3.1<L>   |  22  |  0.90    Ca    8.9      01 Jun 2017 08:14  Phos  3.0     06-01  Mg     1.9     06-01    TPro  6.0  /  Alb  3.7  /  TBili  0.8  /  DBili  x   /  AST  25  /  ALT  17  /  AlkPhos  57  06-01    PT/INR - ( 31 May 2017 17:33 )   PT: 11.8 sec;   INR: 1.06            Cultures  Culture Results:   No growth at 12 hours (05-31 @ 18:54)  Culture Results:   No growth at 12 hours (05-31 @ 18:54)      A/P  77M with prostatitis currently during TOV  -cont abx, consider upgrade (lakshmi if fever/chills or symptoms worsening)  -cont flomax  -f/u Ucxs  -monitor UO  -post void bladder scan (please record in [place])  -OOB  -bowel regimen  -No THADDEUS, rectal temps or anything [place]  -f/u as outpt with urologist  - will follow as inpt

## 2017-06-01 NOTE — DISCHARGE NOTE ADULT - ADDITIONAL INSTRUCTIONS
Please see your urologist on Monday.  Please follow up with cardiology for possible stress test within 1 week.

## 2017-06-01 NOTE — DISCHARGE NOTE ADULT - CARE PROVIDERS DIRECT ADDRESSES
,DirectAddress_Unknown,[email or web address],DirectAddress_Unknown

## 2017-06-01 NOTE — CONSULT NOTE ADULT - SUBJECTIVE AND OBJECTIVE BOX
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 He reports going to the ED a few days ago for shortness of breath, jaw pain and nausea. A work up was done at a hospital in [facility] which was negative (including troponins and EKG) and he was sent home. Of note, he also had a recent echocardiogram done by his cardiologist Dr. Taylor, which was unchanged and WNL. He also reports recently seeing his urologist who prescribed a 10 day course of Ciprofloxacin which he finished on three days ago and had been symptom free until tonight; he also reports an increased PSA of 8. Since in the ED the pt reports having increased hesitancy with nausea, w/o vomiting or diarrhea or fever but does endorse chills.     Of note in the ED the pt was found to be retaining 380cc of urine on a bladder scan. He reports feeling relief after the catheterization.     PAST MEDICAL & SURGICAL HISTORY:  Bradycardia  Dupuytren&#x27;s contracture syndrome  Melanoma  HTN (hypertension)  History of appendectomy      [ ] Diabetes   [ ] Hypertension  [ ] Hyperlipidemia  [ ] CAD  [ ] PCI  [ ] CABG    PREVIOUS DIAGNOSTIC TESTING:    [ ] Echocardiogram:  [ ]  Catheterization:  [ ] Stress Test:  	    MEDICATIONS:  tamsulosin 0.4milliGRAM(s) Oral at bedtime  losartan 6.25milliGRAM(s) Oral daily  losartan 50milliGRAM(s) Oral daily  amLODIPine   Tablet 5milliGRAM(s) Oral daily    ciprofloxacin   IVPB 400milliGRAM(s) IV Intermittent every 12 hours      ondansetron   Disintegrating Tablet 4milliGRAM(s) Oral every 6 hours PRN        heparin  Injectable 5000Unit(s) SubCutaneous every 8 hours  sodium chloride 0.9%. 1000milliLiter(s) IV Continuous <Continuous>      FAMILY HISTORY:  No pertinent family history in first degree relatives      SOCIAL HISTORY:    [ ] Non-smoker  [ ] Smoker  [ ] Alcohol    Allergies    No Known Allergies    Intolerances    	    REVIEW OF SYSTEMS:    [x] as per HPI  CONSTITUTIONAL: No fever, weight loss, or fatigue  ENT:  No difficulty hearing, tinnitus, vertigo; No sinus or throat pain  RESPIRATORY: No cough, wheezing, chills or hemoptysis; No Shortness of Breath  CARDIOVASCULAR: No chest pain, palpitations, dizziness, or leg swelling  GASTROINTESTINAL: No abdominal or epigastric pain. No nausea, vomiting, or hematemesis; No diarrhea or constipation. No melena or hematochezia.  GENITOURINARY: No dysuria, frequency, hematuria, or incontinence  NEUROLOGICAL: No headaches, memory loss, loss of strength, numbness, or tremors  MUSCULOSKELETAL: No joint pain or swelling; No muscle, back, or extremity pain  [x] All others negative	  [ ] Unable to obtain    PHYSICAL EXAM:  T(C): 36.4, Max: 36.8 (05-31 @ 16:44)  HR: 78 (61 - 91)  BP: 111/67 (111/67 - 189/83)  RR: 20 (17 - 20)  SpO2: 99% (96% - 100%)  Wt(kg): --  I&O's Summary    I & Os for current day (as of 01 Jun 2017 10:31)  =============================================  IN: 435 ml / OUT: 2575 ml / NET: -2140 ml      Appearance: Normal	  HEENT:   Normal oral mucosa, PERRL, EOMI	  Lymphatic: No lymphadenopathy  Cardiovascular: Normal S1 S2, No JVD, No murmurs, No edema  Respiratory: Lungs clear to auscultation	  Psychiatry: A & O x 3, Mood & affect appropriate  Gastrointestinal:  Soft, Non-tender, + BS	  Skin: No rashes, No ecchymoses, No cyanosis	  Neurologic: Non-focal  Extremities: Normal range of motion, No clubbing, cyanosis or edema  Vascular: Peripheral pulses palpable 2+ bilaterally    TELEMETRY: 	    ECG:  Diagnosis Line Normal sinus rhythm  Left anterior fascicular block  ECHO:  STRESS:  CATH:  	  RADIOLOGY:  CXR:  CT:1.  No aortic dissection.  2.  Moderate to severe atherosclerotic calcification of the aorta and its   branches.  3.  Extensive stool throughout the colon.  4.  Marked prostamegaly.    US:   	  	  LABS:	 	    CARDIAC MARKERS:                                  13.8   6.7   )-----------( 275      ( 31 May 2017 17:33 )             37.4     06-01    130<L>  |  94<L>  |  7   ----------------------------<  95  3.1<L>   |  22  |  0.90    Ca    8.9      01 Jun 2017 08:14  Phos  3.0     06-01  Mg     1.9     06-01    TPro  6.0  /  Alb  3.7  /  TBili  0.8  /  DBili  x   /  AST  25  /  ALT  17  /  AlkPhos  57  06-01    proBNP:   Lipid Profile:   HgA1c:   TSH: Thyroid Stimulating Hormone, Serum: 2.024 uIU/mL (05-31 @ 18:16)      ASSESSMENT/PLAN: 	  1. CAD - age, gender htn normal ecg Aorta with atherosclerosis, recent echo at outpt cardio with normal EF  would recommend ASA 81mg for primary prevention, and check Lipid panel and vitamin D levels - start lipitor 80mg   2. bradycardia - no syncope bradyarrhythmia on ecg, today feels fine s/p sandoval.  3 Obstructive Uroopathy - Dr Taylor consulted from Urology for consultation

## 2017-06-01 NOTE — DISCHARGE NOTE ADULT - CARE PROVIDER_API CALL
Clinton Pereira), Urology  [address] [place] [address]  Phone: [phone]  Fax: [phone]    Nga Suarez), Internal Medicine  [address] [place] [address]  Phone: [phone]  Fax: [phone]    Dr. Martinez, Hallie  Phone: [phone]  Fax: (       -

## 2017-06-01 NOTE — DISCHARGE NOTE ADULT - PLAN OF CARE
Finish course of antibiotics. Please see outpatient urologist Monday for further antibiotic needs. You were restarted on cipro as you likely did not have a full treatment course. Please continue the antibiotics for 7 more days. Make an appointment with your urologist on Monday to assess need for continued antibiotics. You can continue using flomax for your urinary retention and follow up with urology for further management. If you would like to follow up with Dr. Pereira (urology from [facility]), his information is listed below. You were worked up already for possible cardiac issues for your shortness of breath. EKG, cardiac blood work, and echo done in the outpatient setting were normal. Please follow up with your Cardiologist (Dr. Martinez) for a stress test to further evaluate your symptoms. You can start aspirin for prevention, but please hold off on starting until the blood in your urine is gone. If you would like to follow up with Dr. Suarez (cardiology from [facility]), his information is listed below.

## 2017-06-02 LAB
CULTURE RESULTS: NO GROWTH — SIGNIFICANT CHANGE UP
SPECIMEN SOURCE: SIGNIFICANT CHANGE UP

## 2017-06-05 LAB
CULTURE RESULTS: SIGNIFICANT CHANGE UP
CULTURE RESULTS: SIGNIFICANT CHANGE UP
SPECIMEN SOURCE: SIGNIFICANT CHANGE UP
SPECIMEN SOURCE: SIGNIFICANT CHANGE UP

## 2017-06-06 DIAGNOSIS — R31.9 HEMATURIA, UNSPECIFIED: ICD-10-CM

## 2017-06-06 DIAGNOSIS — I25.10 ATHEROSCLEROTIC HEART DISEASE OF NATIVE CORONARY ARTERY WITHOUT ANGINA PECTORIS: ICD-10-CM

## 2017-06-06 DIAGNOSIS — E86.0 DEHYDRATION: ICD-10-CM

## 2017-06-06 DIAGNOSIS — R06.02 SHORTNESS OF BREATH: ICD-10-CM

## 2017-06-06 DIAGNOSIS — E87.8 OTHER DISORDERS OF ELECTROLYTE AND FLUID BALANCE, NOT ELSEWHERE CLASSIFIED: ICD-10-CM

## 2017-06-06 DIAGNOSIS — R33.9 RETENTION OF URINE, UNSPECIFIED: ICD-10-CM

## 2017-06-06 DIAGNOSIS — I16.0 HYPERTENSIVE URGENCY: ICD-10-CM

## 2017-06-06 DIAGNOSIS — N41.9 INFLAMMATORY DISEASE OF PROSTATE, UNSPECIFIED: ICD-10-CM

## 2017-06-06 DIAGNOSIS — Z85.820 PERSONAL HISTORY OF MALIGNANT MELANOMA OF SKIN: ICD-10-CM

## 2017-06-06 DIAGNOSIS — M72.0 PALMAR FASCIAL FIBROMATOSIS [DUPUYTREN]: ICD-10-CM

## 2017-06-06 DIAGNOSIS — I10 ESSENTIAL (PRIMARY) HYPERTENSION: ICD-10-CM

## 2017-06-06 DIAGNOSIS — E87.1 HYPO-OSMOLALITY AND HYPONATREMIA: ICD-10-CM

## 2018-03-22 NOTE — H&P ADULT - PROBLEM SELECTOR PLAN 7
Oriented - self; Oriented - place; Oriented - time HSQ    Dispo    admit to medicine  -full code DASH diet  -replete electrolytes as needed

## 2021-01-21 PROBLEM — Z00.00 ENCOUNTER FOR PREVENTIVE HEALTH EXAMINATION: Status: ACTIVE | Noted: 2021-01-21

## 2021-01-22 PROBLEM — R00.1 BRADYCARDIA, UNSPECIFIED: Chronic | Status: ACTIVE | Noted: 2017-05-31

## 2021-01-22 PROBLEM — M72.0 PALMAR FASCIAL FIBROMATOSIS [DUPUYTREN]: Chronic | Status: ACTIVE | Noted: 2017-05-31

## 2021-01-22 PROBLEM — C43.9 MALIGNANT MELANOMA OF SKIN, UNSPECIFIED: Chronic | Status: ACTIVE | Noted: 2017-05-31

## 2021-01-22 PROBLEM — I10 ESSENTIAL (PRIMARY) HYPERTENSION: Chronic | Status: ACTIVE | Noted: 2017-05-31

## 2021-02-12 ENCOUNTER — APPOINTMENT (OUTPATIENT)
Dept: COLORECTAL SURGERY | Facility: CLINIC | Age: 81
End: 2021-02-12
Payer: COMMERCIAL

## 2021-02-12 DIAGNOSIS — Z82.49 FAMILY HISTORY OF ISCHEMIC HEART DISEASE AND OTHER DISEASES OF THE CIRCULATORY SYSTEM: ICD-10-CM

## 2021-02-12 DIAGNOSIS — I51.9 HEART DISEASE, UNSPECIFIED: ICD-10-CM

## 2021-02-12 DIAGNOSIS — K57.32 DIVERTICULITIS OF LARGE INTESTINE W/OUT PERFORATION OR ABSCESS W/OUT BLEEDING: ICD-10-CM

## 2021-02-12 DIAGNOSIS — Z80.0 FAMILY HISTORY OF MALIGNANT NEOPLASM OF DIGESTIVE ORGANS: ICD-10-CM

## 2021-02-12 DIAGNOSIS — E78.00 PURE HYPERCHOLESTEROLEMIA, UNSPECIFIED: ICD-10-CM

## 2021-02-12 DIAGNOSIS — C43.59 MALIGNANT MELANOMA OF OTHER PART OF TRUNK: ICD-10-CM

## 2021-02-12 DIAGNOSIS — K52.9 NONINFECTIVE GASTROENTERITIS AND COLITIS, UNSPECIFIED: ICD-10-CM

## 2021-02-12 DIAGNOSIS — I10 ESSENTIAL (PRIMARY) HYPERTENSION: ICD-10-CM

## 2021-02-12 PROCEDURE — 99204 OFFICE O/P NEW MOD 45 MIN: CPT | Mod: 95

## 2021-02-12 RX ORDER — TELMISARTAN 40 MG/1
40 TABLET ORAL
Refills: 0 | Status: ACTIVE | COMMUNITY

## 2021-02-12 RX ORDER — AMLODIPINE BESYLATE 2.5 MG/1
2.5 TABLET ORAL
Refills: 0 | Status: ACTIVE | COMMUNITY

## 2021-02-12 RX ORDER — PITAVASTATIN CALCIUM 4.18 MG/1
4 TABLET, FILM COATED ORAL
Refills: 0 | Status: ACTIVE | COMMUNITY

## 2021-02-12 NOTE — HISTORY OF PRESENT ILLNESS
[Medical Office: ([facility])___] : at the medical office located in  [Verbal consent obtained from patient] : the patient, [unfilled] [Home] : at home, [unfilled] , at the time of the visit. 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 Never had prior imaging.\par \par Patient presented to [facility] ED on 1/16/21 c/o 10 days of abdominal pain that migrated to LLQ for the last 3 days, associated w/ low appetite and fever. PCP had started on Levaquin/Flagyl on 1/14, however symptoms worsened.\par WBC (13.48), hgb/hct 9.7/29\par CT a/p:\par Impression:\par Extensive colonic diverticulosis within the descending and sigmoid colon. Mural thickening w/ mild inflammation of the descending and sigmoid colon w/ adjacent adenopathy could be due to colitis. No abscess or free air\par \par Patient states he received IV fluids and antibiotics w/ noticeable improvement fter two days and patient opted for discharge with 10 days Cefuroxime 500 mg 2 tabs BID and advised to continue Flagyl.\par \par Patient reports he's feeling overall improved, however every 2 days experiences intermittent LLQ discomfort prior to meals, improved after eating or passing BM\par hx of diarrhea 3-4 days ago that has since resolved, occasional foul odor\par Denies fever, body aches/chills, n/v\par Has been eating proteins, potatoes, drinking Gatorade and fluids\par \par BH: 2-3 times daily, no issues\par hx of chronic constipation, and typically would take Dulcolax every few days\par Takes daily probiotic\par Last colonoscopy 2017, hx of polyps in past\par Mother w/ colon CA diagnosed age 80 which required resection

## 2021-02-12 NOTE — ASSESSMENT
[FreeTextEntry1] : I reviewed with the patient that his CT findings are not classic nor consistent with diverticulitis but may rather represent segmental focal colitis with mucosal thickening and lymphadenopathy. I have counseled him regarding further GI followup and a colonoscopy in 6-8 weeks. Pending review of the colonoscopy and biopsy further treatment recommendations will be forthcoming.